# Patient Record
Sex: MALE | NOT HISPANIC OR LATINO | Employment: PART TIME | URBAN - METROPOLITAN AREA
[De-identification: names, ages, dates, MRNs, and addresses within clinical notes are randomized per-mention and may not be internally consistent; named-entity substitution may affect disease eponyms.]

---

## 2020-09-21 ENCOUNTER — OFFICE VISIT (OUTPATIENT)
Dept: SURGERY | Facility: CLINIC | Age: 70
End: 2020-09-21

## 2020-09-21 VITALS — BODY MASS INDEX: 31.91 KG/M2 | HEIGHT: 61 IN | WEIGHT: 169 LBS | OXYGEN SATURATION: 98 % | HEART RATE: 74 BPM

## 2020-09-21 DIAGNOSIS — K40.90 RIGHT INGUINAL HERNIA: Primary | ICD-10-CM

## 2020-09-21 PROCEDURE — 99204 OFFICE O/P NEW MOD 45 MIN: CPT | Performed by: SURGERY

## 2020-09-21 RX ORDER — LOSARTAN POTASSIUM 25 MG/1
25 TABLET ORAL DAILY
COMMUNITY
Start: 2020-08-04

## 2020-09-23 NOTE — PROGRESS NOTES
SUMMARY (A/P):    70-year-old gentleman with recurrent right inguinal hernia.  I recommended proceeding with laparoscopic right inguinal hernia repair.  He understands the nature of the procedure and the risks including but not limited to bleeding, infection, use of mesh, and recurrence.  He also understands the risk of injury to spermatic vessels which could result in testicle loss and need for hormone replacement given that he has no left testicle.  Also understands increased risk of conversion to open procedure due to his prior surgical interventions.      CC:    Hernia    HPI:    70-year-old gentleman presents with several year history of mild right inguinal pain associated with palpable bulge.    RADIOLOGY/ENDOSCOPY:    • CT abdomen pelvis 8/22/2014 showed small right inguinal hernia, reviewed those images and concur    PHYSICAL EXAM:   • Constitutional: Well-developed well-nourished, no acute distress  • Vital signs: Weight 169 pounds, height 61 inches, BMI 31.9  • Eyes: Conjunctiva normal, sclera nonicteric  • ENMT: Hearing grossly normal, oral mucosa moist  • Neck: Supple, no palpable mass, trachea midline  • Respiratory: Clear to auscultation, normal inspiratory effort  • Cardiovascular: Regular rate, no murmur, no carotid bruit, no peripheral edema, no jugular venous distention  • Gastrointestinal: Soft, nontender, no palpable mass, no hepatosplenomegaly, negative for hernia.  Well-healed lower midline incision.  • Genitourinary: Right testicle is descended and normal.  Open right inguinal hernia scar is present.  Moderate reducible recurrent right inguinal hernia is present.  Open left inguinal hernia scar is present with no visible or palpable hernia.  • Lymphatics (palpable nodes):  cervical-negative, axillary-negative  • Skin:  Warm, dry, no rash on visualized skin surfaces  • Musculoskeletal: Symmetric strength, normal gait  • Psychiatric: Alert and oriented ×3, normal affect     ALLERGIES:    • None    MEDICATIONS:   • Dulera  • Losartan    PMH:    • Asthma  • Hypertension    PSH:    • Colonoscopy 2010  • Exploratory laparotomy through lower midline incision for left undescended testicle 1983  • Open right inguinal hernia repair 1974  • Left inguinal exploration for undescended testicle 1958    FAMILY HISTORY:    • Negative for colorectal cancer    SOCIAL HISTORY:   • Denies tobacco use  • Occasional alcohol use    ROS:    Influenza Like Illness: no fever, no  cough, no  sore throat, no  body aches, no loss of sense of taste or smell, no known exposure to person with Covid-19.  All other systems reviewed and negative other than presenting complaints.    ABBIE CASTAÑEDA M.D.

## 2024-05-08 ENCOUNTER — TELEPHONE (OUTPATIENT)
Dept: GASTROENTEROLOGY | Facility: CLINIC | Age: 74
End: 2024-05-08
Payer: MEDICARE